# Patient Record
Sex: FEMALE | Race: WHITE | NOT HISPANIC OR LATINO
[De-identification: names, ages, dates, MRNs, and addresses within clinical notes are randomized per-mention and may not be internally consistent; named-entity substitution may affect disease eponyms.]

---

## 2019-03-25 ENCOUNTER — APPOINTMENT (OUTPATIENT)
Dept: GASTROENTEROLOGY | Facility: CLINIC | Age: 64
End: 2019-03-25
Payer: COMMERCIAL

## 2019-03-25 VITALS
HEART RATE: 78 BPM | HEIGHT: 60 IN | OXYGEN SATURATION: 97 % | DIASTOLIC BLOOD PRESSURE: 83 MMHG | WEIGHT: 147 LBS | BODY MASS INDEX: 28.86 KG/M2 | TEMPERATURE: 98.2 F | SYSTOLIC BLOOD PRESSURE: 138 MMHG

## 2019-03-25 DIAGNOSIS — K21.9 GASTRO-ESOPHAGEAL REFLUX DISEASE W/OUT ESOPHAGITIS: ICD-10-CM

## 2019-03-25 DIAGNOSIS — Z86.39 PERSONAL HISTORY OF OTHER ENDOCRINE, NUTRITIONAL AND METABOLIC DISEASE: ICD-10-CM

## 2019-03-25 DIAGNOSIS — K64.8 OTHER HEMORRHOIDS: ICD-10-CM

## 2019-03-25 DIAGNOSIS — Z12.11 ENCOUNTER FOR SCREENING FOR MALIGNANT NEOPLASM OF COLON: ICD-10-CM

## 2019-03-25 PROBLEM — Z00.00 ENCOUNTER FOR PREVENTIVE HEALTH EXAMINATION: Status: ACTIVE | Noted: 2019-03-25

## 2019-03-25 PROCEDURE — 99213 OFFICE O/P EST LOW 20 MIN: CPT

## 2019-03-25 RX ORDER — SIMVASTATIN 10 MG/1
10 TABLET, FILM COATED ORAL
Refills: 0 | Status: ACTIVE | COMMUNITY

## 2019-04-18 ENCOUNTER — APPOINTMENT (OUTPATIENT)
Dept: GASTROENTEROLOGY | Facility: HOSPITAL | Age: 64
End: 2019-04-18

## 2019-04-18 ENCOUNTER — OUTPATIENT (OUTPATIENT)
Dept: OUTPATIENT SERVICES | Facility: HOSPITAL | Age: 64
LOS: 1 days | End: 2019-04-18
Payer: COMMERCIAL

## 2019-04-18 DIAGNOSIS — Z12.11 ENCOUNTER FOR SCREENING FOR MALIGNANT NEOPLASM OF COLON: ICD-10-CM

## 2019-04-18 PROCEDURE — 45378 DIAGNOSTIC COLONOSCOPY: CPT

## 2019-04-18 PROCEDURE — G0105: CPT

## 2020-03-03 ENCOUNTER — TRANSCRIPTION ENCOUNTER (OUTPATIENT)
Age: 65
End: 2020-03-03

## 2020-03-04 ENCOUNTER — OUTPATIENT (OUTPATIENT)
Dept: OUTPATIENT SERVICES | Facility: HOSPITAL | Age: 65
LOS: 1 days | Discharge: HOME | End: 2020-03-04

## 2024-08-29 ENCOUNTER — NON-APPOINTMENT (OUTPATIENT)
Age: 69
End: 2024-08-29

## 2024-08-30 ENCOUNTER — APPOINTMENT (OUTPATIENT)
Dept: GASTROENTEROLOGY | Facility: CLINIC | Age: 69
End: 2024-08-30
Payer: MEDICARE

## 2024-08-30 VITALS
OXYGEN SATURATION: 99 % | TEMPERATURE: 97.2 F | HEIGHT: 60 IN | WEIGHT: 148 LBS | HEART RATE: 84 BPM | SYSTOLIC BLOOD PRESSURE: 131 MMHG | BODY MASS INDEX: 29.06 KG/M2 | DIASTOLIC BLOOD PRESSURE: 69 MMHG

## 2024-08-30 DIAGNOSIS — K21.9 GASTRO-ESOPHAGEAL REFLUX DISEASE W/OUT ESOPHAGITIS: ICD-10-CM

## 2024-08-30 DIAGNOSIS — K64.8 OTHER HEMORRHOIDS: ICD-10-CM

## 2024-08-30 DIAGNOSIS — Z80.9 FAMILY HISTORY OF MALIGNANT NEOPLASM, UNSPECIFIED: ICD-10-CM

## 2024-08-30 PROCEDURE — 99204 OFFICE O/P NEW MOD 45 MIN: CPT

## 2024-08-30 RX ORDER — SODIUM PICOSULFATE, MAGNESIUM OXIDE, AND ANHYDROUS CITRIC ACID 12; 3.5; 1 G/175ML; G/175ML; MG/175ML
10-3.5-12 MG-GM LIQUID ORAL TWICE DAILY
Qty: 2 | Refills: 0 | Status: ACTIVE | COMMUNITY
Start: 2024-08-30 | End: 1900-01-01

## 2024-08-30 RX ORDER — FAMOTIDINE 40 MG/1
40 TABLET, FILM COATED ORAL
Qty: 60 | Refills: 3 | Status: ACTIVE | COMMUNITY
Start: 2024-08-30 | End: 1900-01-01

## 2024-08-30 NOTE — HISTORY OF PRESENT ILLNESS
[FreeTextEntry1] : The colonoscopy ileum performed on April 18, 2019 revealed a normal colon and small internal hemorrhoids.  There were no polyps, masses, diverticulosis, AVMs or colitis noted.  The colonoscopy to the terminal ileum performed at the office on February 20, 2007 revealed a normal colon and small internal hemorrhoids with hypertrophied anal papillae.

## 2024-08-30 NOTE — ASSESSMENT
[FreeTextEntry1] : GERD: The patient was advised to avoid late-night meals and dietary indiscretions.  The patient was advised to avoid fried and fatty foods.  The patient was advised to abide by an anti-GERD diet. The patient was given a pamphlet for anti-GERD.  The patient and I reviewed the anti-GERD diet at length. I recommend a trial of famotidine 40 mg twice a day x 3 months for the symptoms. If the symptoms persist, the patient may require an upper endoscopy to assess for peptic ulcer disease versus esophagitis.  The patient was told of the risks and benefits of the procedure.  The patient was told of the risks of perforation, emergency surgery, bleeding, infections and missed lesions.  The patient agreed and will follow-up to reassess the symptoms. Family History of GI Malignancy: The patient admits to a family history of GI problems.  The patient's sister had a history of gallbladder disease and maternal nephew had rectal cancer at age 45.     Colonoscopy: I recommend a colonoscopy to assess the symptoms and for colonic polyps.  The patient was told of the risks and benefits of the procedure.  The patient was told of the risks of perforation, emergency surgery, bleeding, infections and missed lesions.  The patient is to be on a clear liquid diet the entire day prior to the procedure. The patient is to complete the entire prescribed bowel prep the day prior to the procedure as directed. The patient is told not to drive, drink alcohol, use recreational drugs, exercise, or work the day of the procedure.  The patient was told of the need for an escort to accompany the patient home after the procedure. The patient is aware that the procedure may be cancelled if they fail to follow the directions.  The patient agreed and will schedule for the procedure. The patient is to be n.p.o. after midnight and bowel prep was given.  The patient is to return for the procedure. Follow-up: The patient is to follow-up in the office in 4 weeks to reassess the symptoms. The patient was told to call the office if any further problems.

## 2024-10-18 NOTE — ASU PATIENT PROFILE, ADULT - AS SC BRADEN ACTIVITY
----- Message from Melina Lindo sent at 9/16/2019  4:50 PM CDT -----  Contact: Pt  Patient called in regards to cutting her finger this morning pt stated that it is swollen and she is not able to bend finger. Patient would like to know what she should do.    Patient can be reached at [phone]   (4) walks frequently

## 2024-10-21 ENCOUNTER — TRANSCRIPTION ENCOUNTER (OUTPATIENT)
Age: 69
End: 2024-10-21

## 2024-10-21 ENCOUNTER — APPOINTMENT (OUTPATIENT)
Dept: GASTROENTEROLOGY | Facility: HOSPITAL | Age: 69
End: 2024-10-21

## 2024-10-21 ENCOUNTER — OUTPATIENT (OUTPATIENT)
Dept: OUTPATIENT SERVICES | Facility: HOSPITAL | Age: 69
LOS: 1 days | End: 2024-10-21
Payer: MEDICARE

## 2024-10-21 VITALS
DIASTOLIC BLOOD PRESSURE: 65 MMHG | RESPIRATION RATE: 16 BRPM | OXYGEN SATURATION: 99 % | HEART RATE: 77 BPM | SYSTOLIC BLOOD PRESSURE: 126 MMHG

## 2024-10-21 VITALS
SYSTOLIC BLOOD PRESSURE: 136 MMHG | HEIGHT: 60 IN | HEART RATE: 79 BPM | RESPIRATION RATE: 18 BRPM | OXYGEN SATURATION: 100 % | DIASTOLIC BLOOD PRESSURE: 74 MMHG | WEIGHT: 147.93 LBS | TEMPERATURE: 98 F

## 2024-10-21 DIAGNOSIS — Z80.9 FAMILY HISTORY OF MALIGNANT NEOPLASM, UNSPECIFIED: ICD-10-CM

## 2024-10-21 DIAGNOSIS — Z90.11 ACQUIRED ABSENCE OF RIGHT BREAST AND NIPPLE: Chronic | ICD-10-CM

## 2024-10-21 PROCEDURE — G0105: CPT

## 2024-10-21 RX ORDER — SODIUM CHLORIDE 9 MG/ML
500 INJECTION, SOLUTION INTRAMUSCULAR; INTRAVENOUS; SUBCUTANEOUS
Refills: 0 | Status: COMPLETED | OUTPATIENT
Start: 2024-10-21 | End: 2024-10-21

## 2024-10-21 RX ADMIN — SODIUM CHLORIDE 30 MILLILITER(S): 9 INJECTION, SOLUTION INTRAMUSCULAR; INTRAVENOUS; SUBCUTANEOUS at 13:35

## 2024-10-21 NOTE — ASU DISCHARGE PLAN (ADULT/PEDIATRIC) - NS MD DC FALL RISK RISK
For information on Fall & Injury Prevention, visit: [email or web address] OR  [email or web address] OR  https://www.cdc.gov/steadi/patient.html

## 2024-10-21 NOTE — ASU DISCHARGE PLAN (ADULT/PEDIATRIC) - FINANCIAL ASSISTANCE
[facility] provides services at a reduced cost to those who are determined to be eligible through [facility]’s financial assistance program. Information regarding [facility]’s financial assistance program can be found by going to [email or web address] or by calling [phone].

## 2024-10-21 NOTE — CHART NOTE - NSCHARTNOTEFT_GEN_A_CORE
History of Present Illness       The patient is a 69-year-old female with past medical history significant for hypercholesterolemia who was referred to my office by Dr. Cesilia Coles for family history of colon cancer. The patient also admits to having occasional gastroesophageal reflux disease. The patient also came to the office for evaluation for high risk colon cancer screening. The patient admits to a family history of GI problems. The patient's sister had a history of gallbladder disease and maternal nephew had rectal cancer at age 45.   I was asked to render an opinion for consultation for the above complaints. The patient states that she is feeling fine. The patient denies any abdominal pain. The patient denies any abdominal gas and bloating. The patient denies any nausea or vomiting. The patient complains of occasional gastroesophageal reflux disease but denies any dysphagia. The gastroesophageal reflux disease is worse after meals and late at night and in the early morning. The gastroesophageal reflux disease is improved with proton pump inhibitors, H2 blockers and antacids. The patient denies any atypical chest pain, shortness of breath or palpitations. The patient denies any diaphoresis. The patient denies any constipation or diarrhea. The patient has 1 bowel movement a day. The patient denies a change in bowel habits. The patient denies a change in caliber of stool. The patient denies having mucus discharge with the bowel movements. The patient denies any bright red blood per rectum, melena or hematemesis. The patient denies any rectal pain or rectal pruritus. The patient denies any weight loss or anorexia. The patient admits to gaining weight recently. She denies any fevers or chills. The patient denies any jaundice or pruritus. The patient complains of occasional right lower back pain. The colonoscopy ileum performed on 2019 revealed a normal colon and small internal hemorrhoids. There were no polyps, masses, diverticulosis, AVMs or colitis noted. The colonoscopy to the terminal ileum performed at the office on 2007 revealed a normal colon and small internal hemorrhoids with hypertrophied anal papillae. The patient tolerated the procedure well. The patient denies having a recent upper endoscopy and colonoscopy performed by another gastroenterologist. The patient's last menstrual period was age 48. The patient is a . The patient's first menstrual period was at age 13. The patient admits to a family history of GI problems. The patient's sister had a history of gallbladder disease and maternal nephew had rectal cancer at age 45.  ?  (-) smoking, (-) ETOH, (-) IVDA  ?  Physical Exam:  General Appearance: Well developed, well nourished, no acute distress  ENT: nose clear, ears unremarkable  Eyes: No enteric sclera, conjunctiva clear.  Neck: Supple, without masses  Respiratory: Breath sounds equal and bilateral, no wheezing no rales or rhonchi  Cardiovascular: S1-S2 audible, no murmur, no rubs or gallops  GI: (+) BS, soft, nontender, no rebound, no guarding, no masses  Liver: liver edge palpated  Rectal: nontender, no masses, good sphincter tone, brown stool. Stool for Guaiac sent to lab for occult blood  Lymphatics: No palpable lymph nodes, no masses  Musculo-skeletal: Good motor strength, good range of motion, normal appearing extremities  Skin: Normal appearing skin, no jaundice, no rashes or nodules  Neurological: without focal motor or sensory deficits Patient is moving all extremities spontaneously and to command with normal muscle strength alert and oriented X3  Psychiatric: Good affect, not depressed, not anxious      Gastroenterology Summary    Colonoscopy: The colonoscopy ileum performed on 2019 revealed a normal colon and small internal hemorrhoids. There were no polyps, masses, diverticulosis, AVMs or colitis noted.  ?  The colonoscopy to the terminal ileum performed at the office on 2007 revealed a normal colon and small internal hemorrhoids with hypertrophied anal papillae.  ?  Active Problems  Colon cancer screening (V76.51) (Z12.11)  GERD (gastroesophageal reflux disease) (530.81) (K21.9)  Internal hemorrhoids without complication (455.0) (K64.8)           ?  Past Medical History  History of hypercholesterolemia (V12.29) (Z86.39)           ?  Surgical History  History of Mastectomy           ?  Current Meds  Simvastatin 10 MG Oral Tablet           Allergies  clindamycin           ?  Vitals  Vital Signs  ?  Recorded: [id number] 08:47AM  Systolic  131, LUE, Sitting  Diastolic  69, LUE, Sitting  Height  5 ft  Weight  148 lb  BMI Calculated  28.9 kg/m2  BSA Calculated  1.64 m2  Temperature  97.2 F, Temporal  Heart Rate  84  O2 Saturation  99 %, Room Air  FiO2 Flow Rate  0 L/min, Room Air           ?  Assessment  Family history of cancer (V16.9) (Z80.9)  Internal hemorrhoids without complication (455.0) (K64.8)  GERD (gastroesophageal reflux disease) (530.81) (K21.9)    GERD: The patient was advised to avoid late-night meals and dietary indiscretions. The patient was advised to avoid fried and fatty foods. The patient was advised to abide by an anti-GERD diet. The patient was given a pamphlet for anti-GERD. The patient and I reviewed the anti-GERD diet at length. I recommend a trial of famotidine 40 mg twice a day x 3 months for the symptoms.  If the symptoms persist, the patient may require an upper endoscopy to assess for peptic ulcer disease versus esophagitis. The patient was told of the risks and benefits of the procedure. The patient was told of the risks of perforation, emergency surgery, bleeding, infections and missed lesions. The patient agreed and will follow-up to reassess the symptoms.  Family History of GI Malignancy: The patient admits to a family history of GI problems. The patient's sister had a history of gallbladder disease and maternal nephew had rectal cancer at age 45.  Colonoscopy: I recommend a colonoscopy to assess the symptoms and for colonic polyps. The patient was told of the risks and benefits of the procedure. The patient was told of the risks of perforation, emergency surgery, bleeding, infections and missed lesions. The patient is to be on a clear liquid diet the entire day prior to the procedure. The patient is to complete the entire prescribed bowel prep the day prior to the procedure as directed. The patient is told not to drive, drink alcohol, use recreational drugs, exercise, or work the day of the procedure. The patient was told of the need for an escort to accompany the patient home after the procedure. The patient is aware that the procedure may be cancelled if they fail to follow the directions. The patient agreed and will schedule for the procedure. The patient is to be n.p.o. after midnight and bowel prep was given. The patient is to return for the procedure.  Follow-up: The patient is to follow-up in the office in 4 weeks to reassess the symptoms. The patient was told to call the office if any further problems.            ?  Plan  Family history of cancer  Start: Clenpiq 10-3.5-12 MG-GM -GM/175ML Oral Solution; TAKE 175 ML Twice daily  Colonoscopy Screening; Status:Hold For - Scheduling; Requested for:[id number];  GERD (gastroesophageal reflux disease)  Start: Famotidine 40 MG Oral Tablet; Take 1 tablet twice daily

## 2024-12-20 ENCOUNTER — APPOINTMENT (OUTPATIENT)
Dept: GASTROENTEROLOGY | Facility: CLINIC | Age: 69
End: 2024-12-20
Payer: MEDICARE

## 2024-12-20 ENCOUNTER — APPOINTMENT (OUTPATIENT)
Dept: GASTROENTEROLOGY | Facility: CLINIC | Age: 69
End: 2024-12-20

## 2024-12-20 DIAGNOSIS — K57.90 DIVERTICULOSIS OF INTESTINE, PART UNSPECIFIED, W/OUT PERFORATION OR ABSCESS W/OUT BLEEDING: ICD-10-CM

## 2024-12-20 DIAGNOSIS — K64.8 OTHER HEMORRHOIDS: ICD-10-CM

## 2024-12-20 DIAGNOSIS — Z80.9 FAMILY HISTORY OF MALIGNANT NEOPLASM, UNSPECIFIED: ICD-10-CM

## 2024-12-20 PROCEDURE — 99442: CPT | Mod: 93

## 2024-12-20 RX ORDER — HYDROCORTISONE 25 MG/G
2.5 CREAM TOPICAL 3 TIMES DAILY
Qty: 2 | Refills: 3 | Status: ACTIVE | COMMUNITY
Start: 2024-12-20 | End: 1900-01-01

## (undated) DEVICE — RETRIEVER ROTH NET PLATINUM-UNIVERSAL

## (undated) DEVICE — TUBING MEDI-VAC W MAXIGRIP CONNECTORS 1/4"X6'

## (undated) DEVICE — NDL INJ SCLERO INTERJECT 23G

## (undated) DEVICE — TUBING CANNULA SALTER LABS NASAL ADULT 7FT

## (undated) DEVICE — FORCEP BIOPSY 2.5MM DISP

## (undated) DEVICE — KIT ENDO PROCEDURE CUST W/VLV

## (undated) DEVICE — SOLIDIFIER ISOLYZER 2000 CC

## (undated) DEVICE — SNARE LOOP POLY DISP 30MM LOOP

## (undated) DEVICE — CLAMP BX HOT RAD JAW 3

## (undated) DEVICE — CATH ELCTR GLIDE PRB 7FR

## (undated) DEVICE — SOL INJ NS 0.9% 500ML 1-PORT

## (undated) DEVICE — ADAPTER ENDO CHNL SINGLE USE

## (undated) DEVICE — DRSG CURITY GAUZE SPONGE 4 X 4" 12-PLY

## (undated) DEVICE — SENSOR O2 FINGER ADULT

## (undated) DEVICE — LUBRICATING JELLY ONESHOT 1.25OZ

## (undated) DEVICE — STERIS DEFENDO 3-PIECE KIT (AIR/WATER, SUCTION & BIOPSY VALVES)

## (undated) DEVICE — TUBING IV SET GRAVITY 3Y 100" MACRO

## (undated) DEVICE — Device

## (undated) DEVICE — SYR LUER LOK 50CC